# Patient Record
Sex: FEMALE | Race: WHITE | NOT HISPANIC OR LATINO | Employment: OTHER | URBAN - METROPOLITAN AREA
[De-identification: names, ages, dates, MRNs, and addresses within clinical notes are randomized per-mention and may not be internally consistent; named-entity substitution may affect disease eponyms.]

---

## 2017-07-18 DIAGNOSIS — Z12.31 SCREENING MAMMOGRAM, ENCOUNTER FOR: Primary | ICD-10-CM

## 2017-08-10 ENCOUNTER — HOSPITAL ENCOUNTER (OUTPATIENT)
Dept: RADIOLOGY | Facility: HOSPITAL | Age: 72
Discharge: HOME | End: 2017-08-10
Attending: RADIOLOGY
Payer: MEDICARE

## 2017-08-10 VITALS — BODY MASS INDEX: 40.97 KG/M2 | WEIGHT: 240 LBS | HEIGHT: 64 IN

## 2017-08-10 DIAGNOSIS — Z12.31 SCREENING MAMMOGRAM, ENCOUNTER FOR: ICD-10-CM

## 2017-08-10 PROCEDURE — 77067 SCR MAMMO BI INCL CAD: CPT | Mod: TC

## 2017-09-26 ENCOUNTER — TELEPHONE (OUTPATIENT)
Dept: FAMILY MEDICINE | Facility: CLINIC | Age: 72
End: 2017-09-26

## 2017-09-26 NOTE — TELEPHONE ENCOUNTER
----- Message from Fidelina Perrin sent at 9/26/2017  9:52 AM CDT -----  Contact: - Markso   request returned call from Dr Resendez to discuss wife condition.(personal)

## 2017-09-27 ENCOUNTER — OFFICE VISIT (OUTPATIENT)
Dept: FAMILY MEDICINE | Facility: CLINIC | Age: 72
End: 2017-09-27
Payer: MEDICARE

## 2017-09-27 DIAGNOSIS — G60.0 CMTD (CHARCOT-MARIE-TOOTH DISEASE): ICD-10-CM

## 2017-09-27 DIAGNOSIS — B37.2 CANDIDAL DERMATITIS: Primary | ICD-10-CM

## 2017-09-27 PROCEDURE — 99347 HOME/RES VST EST SF MDM 20: CPT | Mod: ,,, | Performed by: FAMILY MEDICINE

## 2017-09-27 PROCEDURE — 1159F MED LIST DOCD IN RCRD: CPT | Mod: S$GLB,,, | Performed by: FAMILY MEDICINE

## 2017-09-27 RX ORDER — FLUCONAZOLE 200 MG/1
200 TABLET ORAL DAILY
Qty: 10 TABLET | Refills: 0 | Status: SHIPPED | OUTPATIENT
Start: 2017-09-27 | End: 2017-11-15 | Stop reason: SDUPTHER

## 2017-10-01 PROBLEM — B37.2 CANDIDAL DERMATITIS: Status: ACTIVE | Noted: 2017-10-01

## 2017-10-01 PROBLEM — G60.0 CMTD (CHARCOT-MARIE-TOOTH DISEASE): Status: ACTIVE | Noted: 2017-10-01

## 2017-10-02 NOTE — PROGRESS NOTES
Subjective:      Patient ID: Yuly Leggett is a 71 y.o. female.    Chief Complaint: No chief complaint on file.    House call;  present; rash for several days left inguinal crease; OTC antifungal and steroid cream with out relief; has CMT and is chair bound with assistance with transfers by  or sitter; Dr Davis Goode is her internist for many years.      Review of Systems   Skin: Positive for rash.   Neurological: Positive for weakness.   All other systems reviewed and are negative.    Objective:     Physical Exam   Constitutional: She is oriented to person, place, and time. She appears well-developed and well-nourished.   HENT:   Head: Normocephalic.   Eyes: Conjunctivae and EOM are normal. Pupils are equal, round, and reactive to light.   Neck: Normal range of motion. Neck supple.   Cardiovascular: Normal rate, regular rhythm and normal heart sounds.    Pulmonary/Chest: Effort normal and breath sounds normal.   Musculoskeletal: Normal range of motion.   Neurological: She is alert and oriented to person, place, and time. She has normal reflexes.   Skin: Skin is warm and dry. Rash noted.   Red satellite lesions left inguinal area   Psychiatric: She has a normal mood and affect. Her behavior is normal. Judgment and thought content normal.     Assessment:     1. Candidal dermatitis    2. CMTD (Charcot-Selena-Tooth disease)      Plan:     New Prescriptions    FLUCONAZOLE (DIFLUCAN) 200 MG TAB    Take 1 tablet (200 mg total) by mouth once daily. daily for yeast     Discontinued Medications    No medications on file     Modified Medications    No medications on file       Candidal dermatitis    CMTD (Charcot-Selena-Tooth disease)      Rx for diflucan sent to pharmacy

## 2017-11-15 ENCOUNTER — TELEPHONE (OUTPATIENT)
Dept: FAMILY MEDICINE | Facility: CLINIC | Age: 72
End: 2017-11-15

## 2017-11-17 ENCOUNTER — TELEPHONE (OUTPATIENT)
Dept: FAMILY MEDICINE | Facility: CLINIC | Age: 72
End: 2017-11-17

## 2017-11-17 NOTE — TELEPHONE ENCOUNTER
----- Message from Fidelina Perrin sent at 11/17/2017  3:27 PM CST -----  Patient spoke with you on Wednesday. You were suppose to call in something for yeast infection. Patient says pharmacy hasn't received anything     [facility]

## 2017-11-18 RX ORDER — FLUCONAZOLE 200 MG/1
TABLET ORAL
Qty: 10 TABLET | Refills: 0 | Status: SHIPPED | OUTPATIENT
Start: 2017-11-18 | End: 2018-01-02 | Stop reason: SDUPTHER

## 2018-01-02 ENCOUNTER — TELEPHONE (OUTPATIENT)
Dept: FAMILY MEDICINE | Facility: CLINIC | Age: 73
End: 2018-01-02

## 2018-01-02 RX ORDER — FLUCONAZOLE 200 MG/1
TABLET ORAL
Qty: 30 TABLET | Refills: 5 | Status: SHIPPED | OUTPATIENT
Start: 2018-01-02 | End: 2018-07-21 | Stop reason: ALTCHOICE

## 2018-01-02 NOTE — TELEPHONE ENCOUNTER
----- Message from Fidelina Perrin sent at 1/2/2018  1:54 PM CST -----  Contact: Dakota Leggett-    thinks wife has developed another yeast infection. Has rash on both sides of vaginal area were top of leg connects to hip. Had yeast infection 2-3 month ago. Was given Rx but there are no refills.  would like something called in or if Dr Resendez feels needed, a home visit    [facility]

## 2018-01-19 ENCOUNTER — TELEPHONE (OUTPATIENT)
Dept: FAMILY MEDICINE | Facility: CLINIC | Age: 73
End: 2018-01-19

## 2018-01-19 DIAGNOSIS — Z79.899 OTHER LONG TERM (CURRENT) DRUG THERAPY: ICD-10-CM

## 2018-01-19 DIAGNOSIS — B37.2 CANDIDAL DERMATITIS: ICD-10-CM

## 2018-01-19 DIAGNOSIS — L29.9 PRURITUS: ICD-10-CM

## 2018-01-19 DIAGNOSIS — G60.0 CMTD (CHARCOT-MARIE-TOOTH DISEASE): Primary | ICD-10-CM

## 2018-01-20 ENCOUNTER — TELEPHONE (OUTPATIENT)
Dept: FAMILY MEDICINE | Facility: CLINIC | Age: 73
End: 2018-01-20

## 2018-01-20 RX ORDER — NYSTATIN 100000 [USP'U]/G
1 POWDER TOPICAL 4 TIMES DAILY
Qty: 226 G | Refills: 11 | Status: SHIPPED | OUTPATIENT
Start: 2018-01-20 | End: 2018-07-21 | Stop reason: ALTCHOICE

## 2018-01-20 NOTE — PROGRESS NOTES
Subjective:      Patient ID: Yuly Leggett is a 72 y.o. female.    Chief Complaint: No chief complaint on file.    reoccuring persisitan rash in groin, tabitha anal area; pt is essentially bedbound, pt of Dr Davis Goode, with Charcot Selena Toothe for 40 years; ;seen on house call at request of , who lives here in [place]; i've been rx diflucan with good, but incomlete clearance; pt is not a known diabetic; he also request physical therapy for relief of contractures that are developing;  cares for her; he recently a stent placed in coronary artery, and needs another.      Review of Systems   Constitutional: Positive for unexpected weight change.        Gained weight   HENT: Positive for trouble swallowing.    Skin: Positive for rash.   Neurological: Positive for weakness.   All other systems reviewed and are negative.    Objective:     Physical Exam   Constitutional: She is oriented to person, place, and time. She appears well-developed and well-nourished.   obese   HENT:   Head: Normocephalic.   Eyes: Conjunctivae and EOM are normal. Pupils are equal, round, and reactive to light.   Neck: Normal range of motion. Neck supple.   Cardiovascular: Normal rate, regular rhythm and normal heart sounds.    Pulmonary/Chest: Effort normal and breath sounds normal.   Musculoskeletal: She exhibits deformity. She exhibits no edema or tenderness.   Flexure contractures of ankles   Neurological: She is alert and oriented to person, place, and time. She displays abnormal reflex. She exhibits abnormal muscle tone.   Skin: Skin is warm and dry. Rash noted.   Yeast dermatitis of intertriginous folds   Psychiatric: She has a normal mood and affect. Her behavior is normal. Judgment and thought content normal.     Assessment:     No diagnosis found.  Plan:     New Prescriptions    NYSTATIN (MYCOSTATIN) POWDER    Apply 1 g topically 4 (four) times daily.     Discontinued Medications    No medications on file     Modified  Medications    No medications on file       There are no diagnoses linked to this encounter.    A:  Yeast dermatitis        CMT        Obese    P:  Rx for nystatin powder sent; HHN and PT constulted and labs ordered.

## 2018-01-25 ENCOUNTER — TELEPHONE (OUTPATIENT)
Dept: FAMILY MEDICINE | Facility: CLINIC | Age: 73
End: 2018-01-25

## 2018-01-25 NOTE — TELEPHONE ENCOUNTER
[facility] nurse unable to draw pt blood due to poor veins  The pt  wants to know if it is really necessary   To get these labs drawn    Please advise

## 2018-01-29 ENCOUNTER — TELEPHONE (OUTPATIENT)
Dept: FAMILY MEDICINE | Facility: CLINIC | Age: 73
End: 2018-01-29

## 2018-01-30 NOTE — TELEPHONE ENCOUNTER
----- Message from Fidelina Perrin sent at 1/29/2018  3:52 PM CST -----   - [facility] lab results 1/23/2018

## 2018-07-21 ENCOUNTER — HOSPITAL ENCOUNTER (EMERGENCY)
Facility: HOSPITAL | Age: 73
Discharge: HOME | End: 2018-07-21
Attending: EMERGENCY MEDICINE
Payer: MEDICARE

## 2018-07-21 VITALS
SYSTOLIC BLOOD PRESSURE: 115 MMHG | OXYGEN SATURATION: 95 % | RESPIRATION RATE: 18 BRPM | TEMPERATURE: 98 F | HEART RATE: 62 BPM | DIASTOLIC BLOOD PRESSURE: 97 MMHG

## 2018-07-21 DIAGNOSIS — K59.00 CONSTIPATION, UNSPECIFIED CONSTIPATION TYPE: ICD-10-CM

## 2018-07-21 DIAGNOSIS — R53.1 GENERALIZED WEAKNESS: ICD-10-CM

## 2018-07-21 DIAGNOSIS — N39.0 URINARY TRACT INFECTION WITHOUT HEMATURIA, SITE UNSPECIFIED: Primary | ICD-10-CM

## 2018-07-21 LAB
ALBUMIN SERPL BCP-MCNC: 3.9 G/DL
ALP SERPL-CCNC: 108 U/L
ALT SERPL W/O P-5'-P-CCNC: 13 U/L
ANION GAP SERPL CALC-SCNC: 9 MMOL/L
AST SERPL-CCNC: 16 U/L
BACTERIA #/AREA URNS HPF: ABNORMAL /HPF
BASOPHILS # BLD AUTO: 0.03 K/UL
BASOPHILS NFR BLD: 0.4 %
BILIRUB SERPL-MCNC: 0.2 MG/DL
BILIRUB UR QL STRIP: NEGATIVE
BNP SERPL-MCNC: 146 PG/ML
BUN SERPL-MCNC: 9 MG/DL
CALCIUM SERPL-MCNC: 9.4 MG/DL
CHLORIDE SERPL-SCNC: 107 MMOL/L
CLARITY UR: ABNORMAL
CO2 SERPL-SCNC: 24 MMOL/L
COLOR UR: YELLOW
CREAT SERPL-MCNC: 0.6 MG/DL
DIFFERENTIAL METHOD: ABNORMAL
EOSINOPHIL # BLD AUTO: 0.3 K/UL
EOSINOPHIL NFR BLD: 3.6 %
ERYTHROCYTE [DISTWIDTH] IN BLOOD BY AUTOMATED COUNT: 13.8 %
EST. GFR  (AFRICAN AMERICAN): >60 ML/MIN/1.73 M^2
EST. GFR  (NON AFRICAN AMERICAN): >60 ML/MIN/1.73 M^2
GLUCOSE SERPL-MCNC: 98 MG/DL
GLUCOSE UR QL STRIP: NEGATIVE
HCT VFR BLD AUTO: 46.4 %
HGB BLD-MCNC: 14.3 G/DL
HGB UR QL STRIP: NEGATIVE
KETONES UR QL STRIP: NEGATIVE
LEUKOCYTE ESTERASE UR QL STRIP: NEGATIVE
LYMPHOCYTES # BLD AUTO: 1.6 K/UL
LYMPHOCYTES NFR BLD: 22.6 %
MCH RBC QN AUTO: 28.4 PG
MCHC RBC AUTO-ENTMCNC: 30.8 G/DL
MCV RBC AUTO: 92 FL
MICROSCOPIC COMMENT: ABNORMAL
MONOCYTES # BLD AUTO: 0.4 K/UL
MONOCYTES NFR BLD: 5.6 %
NEUTROPHILS # BLD AUTO: 4.9 K/UL
NEUTROPHILS NFR BLD: 67.5 %
NITRITE UR QL STRIP: POSITIVE
PH UR STRIP: 7 [PH] (ref 5–8)
PLATELET # BLD AUTO: 268 K/UL
PMV BLD AUTO: 10.4 FL
POTASSIUM SERPL-SCNC: 4.3 MMOL/L
PROT SERPL-MCNC: 6.3 G/DL
PROT UR QL STRIP: NEGATIVE
RBC # BLD AUTO: 5.04 M/UL
SODIUM SERPL-SCNC: 140 MMOL/L
SP GR UR STRIP: 1.01 (ref 1–1.03)
TROPONIN I SERPL DL<=0.01 NG/ML-MCNC: <0.006 NG/ML
URN SPEC COLLECT METH UR: ABNORMAL
UROBILINOGEN UR STRIP-ACNC: NEGATIVE EU/DL
WBC # BLD AUTO: 7.27 K/UL
WBC #/AREA URNS HPF: 5 /HPF (ref 0–5)

## 2018-07-21 PROCEDURE — 99285 EMERGENCY DEPT VISIT HI MDM: CPT | Mod: 25

## 2018-07-21 PROCEDURE — 84484 ASSAY OF TROPONIN QUANT: CPT

## 2018-07-21 PROCEDURE — 80053 COMPREHEN METABOLIC PANEL: CPT

## 2018-07-21 PROCEDURE — P9612 CATHETERIZE FOR URINE SPEC: HCPCS

## 2018-07-21 PROCEDURE — 93010 ELECTROCARDIOGRAM REPORT: CPT | Mod: ,,, | Performed by: INTERNAL MEDICINE

## 2018-07-21 PROCEDURE — 85025 COMPLETE CBC W/AUTO DIFF WBC: CPT

## 2018-07-21 PROCEDURE — 81000 URINALYSIS NONAUTO W/SCOPE: CPT

## 2018-07-21 PROCEDURE — 93005 ELECTROCARDIOGRAM TRACING: CPT

## 2018-07-21 PROCEDURE — 83880 ASSAY OF NATRIURETIC PEPTIDE: CPT

## 2018-07-21 PROCEDURE — 25000003 PHARM REV CODE 250: Performed by: EMERGENCY MEDICINE

## 2018-07-21 RX ORDER — CITALOPRAM 40 MG/1
40 TABLET, FILM COATED ORAL NIGHTLY
COMMUNITY
End: 2019-08-04 | Stop reason: SDUPTHER

## 2018-07-21 RX ORDER — PROPRANOLOL HYDROCHLORIDE 60 MG/1
60 CAPSULE, EXTENDED RELEASE ORAL DAILY
COMMUNITY
End: 2019-06-18 | Stop reason: SDUPTHER

## 2018-07-21 RX ORDER — SULFAMETHOXAZOLE AND TRIMETHOPRIM 800; 160 MG/1; MG/1
1 TABLET ORAL
Status: COMPLETED | OUTPATIENT
Start: 2018-07-21 | End: 2018-07-21

## 2018-07-21 RX ORDER — IBUPROFEN 100 MG/5ML
1000 SUSPENSION, ORAL (FINAL DOSE FORM) ORAL DAILY
COMMUNITY

## 2018-07-21 RX ORDER — AMOXICILLIN 250 MG
1 CAPSULE ORAL 2 TIMES DAILY
Qty: 60 TABLET | Refills: 0 | Status: SHIPPED | OUTPATIENT
Start: 2018-07-21

## 2018-07-21 RX ORDER — LEVOTHYROXINE SODIUM 125 UG/1
125 TABLET ORAL DAILY
COMMUNITY

## 2018-07-21 RX ORDER — TOPIRAMATE 50 MG/1
50 TABLET, FILM COATED ORAL 2 TIMES DAILY
COMMUNITY
End: 2019-06-09 | Stop reason: SDUPTHER

## 2018-07-21 RX ORDER — SULFAMETHOXAZOLE AND TRIMETHOPRIM 800; 160 MG/1; MG/1
1 TABLET ORAL 2 TIMES DAILY
Qty: 14 TABLET | Refills: 0 | Status: SHIPPED | OUTPATIENT
Start: 2018-07-21 | End: 2018-07-28

## 2018-07-21 RX ORDER — TRAMADOL HYDROCHLORIDE 50 MG/1
100 TABLET ORAL EVERY 8 HOURS PRN
COMMUNITY
End: 2018-11-20 | Stop reason: SDUPTHER

## 2018-07-21 RX ORDER — AMITRIPTYLINE HYDROCHLORIDE 25 MG/1
25 TABLET, FILM COATED ORAL NIGHTLY PRN
COMMUNITY
End: 2018-12-03 | Stop reason: SDUPTHER

## 2018-07-21 RX ORDER — OXYBUTYNIN CHLORIDE 5 MG/1
5 TABLET ORAL 3 TIMES DAILY
COMMUNITY
End: 2019-02-26 | Stop reason: SDUPTHER

## 2018-07-21 RX ORDER — BUPROPION HYDROCHLORIDE 300 MG/1
300 TABLET ORAL DAILY
COMMUNITY

## 2018-07-21 RX ORDER — CHOLECALCIFEROL (VITAMIN D3) 50 MCG
TABLET ORAL DAILY
COMMUNITY

## 2018-07-21 RX ORDER — MONTELUKAST SODIUM 10 MG/1
10 TABLET ORAL NIGHTLY
COMMUNITY
End: 2019-04-02 | Stop reason: SDUPTHER

## 2018-07-21 RX ADMIN — SULFAMETHOXAZOLE AND TRIMETHOPRIM 1 TABLET: 800; 160 TABLET ORAL at 08:07

## 2018-07-21 NOTE — ED NOTES
APPEARANCE: Alert, oriented and in no acute distress.  CARDIAC: Normal rate and rhythm, no murmur heard.   PERIPHERAL VASCULAR: peripheral pulses present. Normal cap refill. No edema. Warm to touch.    RESPIRATORY:Normal rate and effort, breath sounds mild expiratory wheezing bilaterally throughout chest. Respirations are equal and unlabored no obvious signs of distress. SOB upon arrival   GASTRO: soft, bowel sounds normal, no tenderness, no abdominal distention.  MUSC: Full ROM. No bony tenderness or soft tissue tenderness. No obvious deformity. Generalized weakness x 6 days   SKIN: Skin is warm and dry, normal skin turgor, mucous membranes moist.  NEURO: 5/5 strength major flexors/extensors bilaterally. Sensory intact to light touch bilaterally. Kady coma scale: eyes open spontaneously-4, oriented & converses-5, obeys commands-6. No neurological abnormalities.   MENTAL STATUS: awake, alert and aware of environment.  EYE: PERRL, both eyes: pupils brisk and reactive to light. Normal size.  ENT: EARS: no obvious drainage. NOSE: no active bleeding.

## 2018-07-21 NOTE — ED PROVIDER NOTES
Encounter Date: 7/21/2018       History     Chief Complaint   Patient presents with    Weakness     Per EMS from home for increasing weakness and shortness of breath.       72F with Charcot Selena Tooth and declining condition (per ) was brought in by EMS for generalized weakness, shortness of breath, and constipation.  Symptoms began on Monday and have been gradually getting worse.  Her caretaker reports that she is weaker than usual, not able to assist with moving over in bed to transfer or get out of bed to use bedside commode.  Her  and caretaker also note that she seems short of breath with exertion and when she lays flat.  Symptoms improve with rest or sitting up.  She has chronic cough, unchanged.  She also reports not having a bowel movement x 5 days despite taking her usual laxatives and eating prunes and yogurt.  She has had a bowel obstruction before and they are concerned about that.  She recently took 5 days of medication for a skin yeast infection.  SOB is not present in the ER.          Review of patient's allergies indicates:  No Known Allergies  Past Medical History:   Diagnosis Date    Charcot-Selena-Tooth disease     Macular degeneration     Thyroid disease      History reviewed. No pertinent surgical history.  History reviewed. No pertinent family history.  Social History   Substance Use Topics    Smoking status: Never Smoker    Smokeless tobacco: Never Used    Alcohol use Not on file     Review of Systems   Constitutional: Negative for appetite change and fever.        Generalized weakness   Respiratory: Positive for cough and shortness of breath.    Gastrointestinal: Positive for abdominal pain and constipation. Negative for nausea and vomiting.   All other systems reviewed and are negative.      Physical Exam     Initial Vitals [07/21/18 1622]   BP Pulse Resp Temp SpO2   (!) 165/58 72 18 98.4 °F (36.9 °C) 98 %      MAP       --         Physical Exam    Nursing note and  vitals reviewed.  Constitutional: She appears well-developed and well-nourished. No distress.   HENT:   Head: Normocephalic and atraumatic.   Eyes: Conjunctivae are normal.   Neck: Normal range of motion.   Cardiovascular: Normal rate, regular rhythm and normal heart sounds.   Pulmonary/Chest: Breath sounds normal. No respiratory distress.   Abdominal: Soft. Bowel sounds are normal. She exhibits no distension. There is no tenderness. There is no rebound and no guarding.   Musculoskeletal: Normal range of motion.   Neurological: She is alert and oriented to person, place, and time.   Skin: Skin is warm and dry.   Psychiatric: She has a normal mood and affect. Thought content normal.         ED Course   Procedures  Labs Reviewed   CBC W/ AUTO DIFFERENTIAL - Abnormal; Notable for the following:        Result Value    MCHC 30.8 (*)     All other components within normal limits   B-TYPE NATRIURETIC PEPTIDE - Abnormal; Notable for the following:      (*)     All other components within normal limits   URINALYSIS, REFLEX TO URINE CULTURE - Abnormal; Notable for the following:     Appearance, UA Hazy (*)     Nitrite, UA Positive (*)     All other components within normal limits    Narrative:     Preferred Collection Type->Urine, Catheterized   URINALYSIS MICROSCOPIC - Abnormal; Notable for the following:     Bacteria, UA Many (*)     All other components within normal limits    Narrative:     Preferred Collection Type->Urine, Catheterized   COMPREHENSIVE METABOLIC PANEL   TROPONIN I     EKG Readings: (Independently Interpreted)   Initial Reading: No STEMI. Rhythm: Normal Sinus Rhythm. Heart Rate: 68. Ectopy: No Ectopy. Conduction: Normal. ST Segments: Normal ST Segments. T Waves: Normal. Clinical Impression: Normal Sinus Rhythm       Imaging Results          CT Abdomen Pelvis  Without Contrast (Final result)  Result time 07/21/18 19:43:20    Final result by Tatyana Davis MD (07/21/18 19:43:20)                  Impression:      1. No acute intra-abdominal abnormalities identified.  2. Cholecystectomy.  3. Calcified 5 cm left-sided uterine fibroid.  4. Additional findings as detailed above.      Electronically signed by: Tatyana Davis MD  Date:    07/21/2018  Time:    19:43             Narrative:    EXAMINATION:  CT ABDOMEN PELVIS WITHOUT CONTRAST    CLINICAL HISTORY:  abd pain with no BM x 5 days, hx bowel obstruction;    TECHNIQUE:  Low dose axial images, sagittal and coronal reformations were obtained from the lung bases to the pubic symphysis.  Oral contrast was not administered.    COMPARISON:  None    FINDINGS:  The visualized portion of the heart is unremarkable.  Mild atelectatic changes are seen at the lung bases.    No significant hepatic abnormality seen on this noncontrast exam.  There is no intra-or extrahepatic biliary ductal dilatation.  Gallbladder has been removed.  The stomach, pancreas, spleen, and adrenal glands are unremarkable.    Kidneys show no evidence of stones or hydronephrosis. Ureters are unremarkable along their courses.  Urinary bladder is nondistended.  Large 5 cm left-sided calcified uterine fibroid is seen.    The visualized loops of small and large bowel show no evidence of obstruction or inflammation.  No free air or free fluid.    Aorta tapers normally with mild atherosclerosis.    No acute osseous abnormality identified. Subcutaneous soft tissue structures are unremarkable.                               X-Ray Chest 1 View (Final result)  Result time 07/21/18 18:03:12    Final result by Tatyana Davis MD (07/21/18 18:03:12)                 Impression:      Poor inspiratory effort.  No acute cardiopulmonary process identified.      Electronically signed by: Tatyana Davis MD  Date:    07/21/2018  Time:    18:03             Narrative:    EXAMINATION:  XR CHEST 1 VIEW    CLINICAL HISTORY:  SOB;    TECHNIQUE:  Single frontal view of the chest was  performed.    COMPARISON:  None    FINDINGS:  Cardiac silhouette is normal in size.  Lungs are hypoinflated which accentuates pulmonary vascular markings.  Minimal scarring or atelectasis seen at the left lung base.  Lungs are otherwise clear with no focal consolidation, pneumothorax, or significant effusion seen.  No acute osseous abnormality identified.                                 Medical Decision Making:   History:   I obtained history from: someone other than patient.       <> Summary of History:  and caretaker  Independently Interpreted Test(s):   I have ordered and independently interpreted EKG Reading(s) - see prior notes  Clinical Tests:   Lab Tests: Ordered and Reviewed  Radiological Study: Ordered and Reviewed  Medical Tests: Ordered and Reviewed  ED Management:  72F with gen weakness, constipation and SOB with exertion or laying flat.  No signs of edema on exam.  No distress on exam.  Labs are unremarkable except for UA showing UTI.  No acute findings on CT abd/pelvis.  No signs of MI, CHF or pneumonia.  I will treat UTI with bactrim; culture pending.  Senokot-s for constipation.                      Clinical Impression:   The primary encounter diagnosis was Urinary tract infection without hematuria, site unspecified. Diagnoses of Generalized weakness and Constipation, unspecified constipation type were also pertinent to this visit.                             Kayla Spencer MD  07/21/18 [id number]

## 2018-07-22 NOTE — DISCHARGE INSTRUCTIONS
Take ALL of the prescribed antibiotics.  Drink plenty of water.  Take senokot-s in addition to your regular medications.  You may try enemas or suppositories if you do not get relief with this treatment.  Return to ER for any concerns or worsening symptoms.

## 2018-07-25 ENCOUNTER — PATIENT MESSAGE (OUTPATIENT)
Dept: FAMILY MEDICINE | Facility: CLINIC | Age: 73
End: 2018-07-25

## 2018-07-25 ENCOUNTER — NURSE TRIAGE (OUTPATIENT)
Dept: ADMINISTRATIVE | Facility: CLINIC | Age: 73
End: 2018-07-25

## 2018-07-25 ENCOUNTER — TELEPHONE (OUTPATIENT)
Dept: FAMILY MEDICINE | Facility: CLINIC | Age: 73
End: 2018-07-25

## 2018-07-25 NOTE — TELEPHONE ENCOUNTER
----- Message from Alejandra Yamilka sent at 7/25/2018  1:19 PM CDT -----  Contact: Phill, spouse, [phone]   States patient was seen at [facility] emergency department on 7/21 for a bladder infection. Patient was prescribed medication but they don't know if it's the correct medication until culture was finalized today. Please advise.

## 2018-07-25 NOTE — TELEPHONE ENCOUNTER
I spoke to the pt's .  He will continue to monitor her.  He will call back if needed.  Urine culture was never ran.

## 2018-07-25 NOTE — TELEPHONE ENCOUNTER
Sowmya Small Staff   Caller: [phone] / (Today,  8:47 AM)             Patient's  is requesting a call back regarding an ER visit this weekend. She was tested for UTI and wants results. Please advise.

## 2018-07-25 NOTE — TELEPHONE ENCOUNTER
I spoke to the lab.  The urine was never cultured.    I notified the pt's .    He states that they will continue the medication that was rx'd.  He does feel like she is a lil better.     He said to please call him if they should be doing something different.

## 2018-08-14 ENCOUNTER — TELEPHONE (OUTPATIENT)
Dept: FAMILY MEDICINE | Facility: CLINIC | Age: 73
End: 2018-08-14

## 2018-08-14 NOTE — TELEPHONE ENCOUNTER
----- Message from Robe Lay sent at 8/14/2018  8:50 AM CDT -----  Contact: Dr Goode/[phone]  He is aware that Dr Resendez will be calling this afternoon.

## 2018-08-15 NOTE — TELEPHONE ENCOUNTER
I spoke to Dr Goode; pt/ should call when I need to make a house call to see her.  He'll call here or my cell number

## 2018-11-01 ENCOUNTER — HOSPITAL ENCOUNTER (EMERGENCY)
Facility: HOSPITAL | Age: 73
Discharge: HOME | End: 2018-11-01
Attending: EMERGENCY MEDICINE
Payer: MEDICARE

## 2018-11-01 VITALS
OXYGEN SATURATION: 96 % | RESPIRATION RATE: 18 BRPM | HEART RATE: 70 BPM | TEMPERATURE: 98 F | DIASTOLIC BLOOD PRESSURE: 80 MMHG | SYSTOLIC BLOOD PRESSURE: 133 MMHG

## 2018-11-01 DIAGNOSIS — M79.671 RIGHT FOOT PAIN: ICD-10-CM

## 2018-11-01 DIAGNOSIS — M25.561 RIGHT KNEE PAIN: ICD-10-CM
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
## 2018-11-01 PROCEDURE — 99284 EMERGENCY DEPT VISIT MOD MDM: CPT | Mod: 25

## 2018-11-01 PROCEDURE — 25000003 PHARM REV CODE 250: Performed by: EMERGENCY MEDICINE

## 2018-11-01 RX ORDER — TRAMADOL HYDROCHLORIDE 50 MG/1
50 TABLET ORAL
Status: COMPLETED | OUTPATIENT
Start: 2018-11-01 | End: 2018-11-01

## 2018-11-01 RX ADMIN — TRAMADOL HYDROCHLORIDE 50 MG: 50 TABLET, COATED ORAL at 01:11

## 2018-11-01 NOTE — ED NOTES
Patient provided with ice chips per MD order at this time; and  provided with cup of water, call bell within reach pillow provided and blankets patient states she has no further needs at this time will continue to monitor.

## 2018-11-01 NOTE — ED PROVIDER NOTES
Encounter Date: 11/1/2018    SCRIBE #1 NOTE: I, Jeremias Gillis, am scribing for, and in the presence of,  Dr. Rooney. I have scribed the entire note.       History     Chief Complaint   Patient presents with    Fall     fell while trying to scoot from WC to bed. 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 Pt report right hip pain.      Yuly Leggett is a 73 y.o. female who  has a past medical history of Charcot-Selena-Tooth disease, Macular degeneration, and Thyroid disease.    The patient presents to the ED due to right leg pain s/p fall tonight. The patient fell while moving in bed, land on the ground on her right leg. Patient denies any head trauma, neck/back pain, LOC, numbness/tingling, or any other concerning symptoms. Patient is wheelchair bound at baseline, and has a history of CMT disease. Family reports patient takes Tramadol daily for pain.      The history is provided by the patient.     Review of patient's allergies indicates:  No Known Allergies  Past Medical History:   Diagnosis Date    Charcot-Selena-Tooth disease     Macular degeneration     Thyroid disease      No past surgical history on file.  No family history on file.  Social History     Tobacco Use    Smoking status: Never Smoker    Smokeless tobacco: Never Used   Substance Use Topics    Alcohol use: Not on file    Drug use: Not on file     Review of Systems   Constitutional: Negative for chills and fever.   HENT: Negative for sore throat.    Respiratory: Negative for cough and shortness of breath.    Cardiovascular: Negative for chest pain.   Gastrointestinal: Negative for nausea and vomiting.   Genitourinary: Negative for dysuria, frequency and urgency.   Musculoskeletal: Positive for arthralgias and myalgias. Negative for back pain, joint swelling, neck pain and neck stiffness.   Skin: Negative for rash and wound.   Neurological: Negative for syncope and weakness.   Hematological: Does not bruise/bleed easily.   Psychiatric/Behavioral:  Negative for agitation, behavioral problems and confusion.     Physical Exam     Initial Vitals [11/01/18 0209]   BP Pulse Resp Temp SpO2   116/76 68 18 97.8 °F (36.6 °C) (!) 89 %      MAP       --         Physical Exam    Nursing note and vitals reviewed.  Constitutional: She appears well-developed and well-nourished. She is not diaphoretic. No distress.   Pleasant, NAD.   HENT:   Head: Normocephalic and atraumatic.   Mouth/Throat: Oropharynx is clear and moist.   Eyes: EOM are normal. Pupils are equal, round, and reactive to light.   Neck: No tracheal deviation present.   Cardiovascular: Normal rate, regular rhythm, normal heart sounds and intact distal pulses.   Pulmonary/Chest: Breath sounds normal. No stridor. No respiratory distress. She has no wheezes.   Abdominal: Soft. Bowel sounds are normal. She exhibits no distension and no mass. There is no tenderness.   Morbidly obese   Musculoskeletal: Normal range of motion. She exhibits no edema.   Mild anterior hip and thigh TTP  Charcot feet deformity bilaterally   Neurological: She is alert and oriented to person, place, and time. She has normal strength. No cranial nerve deficit or sensory deficit.   Skin: Skin is warm and dry. Capillary refill takes less than 2 seconds. No pallor.   Tiny focal anterior thigh bruising   Psychiatric: She has a normal mood and affect. Her behavior is normal. Thought content normal.       ED Course   Procedures  Labs Reviewed - No data to display       X-Rays:   Independently Interpreted Readings:   Other Readings:  Reviewed by myself, read by radiology.    Imaging Results          X-Ray Foot Complete Right (Final result)  Result time 11/01/18 05:42:34    Final result by Kelly Vance MD (11/01/18 05:42:34)                 Impression:      1. No evidence of acute displaced fracture.  2. Findings suggestive of disuse osteopenia.  3. Postoperative change of the medial and lateral malleoli.      Electronically signed by: Kelly  MD Rajiv  Date:    11/01/2018  Time:    05:42             Narrative:    EXAMINATION:  XR FOOT COMPLETE 3 VIEW RIGHT    CLINICAL HISTORY:  . Pain in right foot    TECHNIQUE:  AP, lateral, and oblique views of the right foot were performed.    COMPARISON:  Tibia fibula radiographs 11/01/2018    FINDINGS:  There is diffuse osteopenia, possibly relating to disuse, which does limit evaluation for acute fracture.  Allowing for this, visualized osseous structures appear intact without evidence of acute displaced fracture.  There is postoperative change of the lateral and medial malleoli.  There is degenerative midfoot change.  No retained radiopaque foreign body.                               CT Pelvis Without Contrast (Final result)  Result time 11/01/18 04:41:52    Final result by Jonathan Pennington MD (11/01/18 04:41:52)                 Impression:      No acute fracture identified in the pelvis.      Electronically signed by: Jonathan Pennington MD  Date:    11/01/2018  Time:    04:41             Narrative:    EXAMINATION:  CT PELVIS WITHOUT CONTRAST    CLINICAL HISTORY:  R hip pain after fall, eval for non-displaced fracture;    TECHNIQUE:  Helical CT images of the pelvis were obtained without IV contrast.  Axial, coronal, and sagittal reconstructions were created.    COMPARISON:  Pelvic radiograph, 11/01/2018.  CT abdomen and pelvis without contrast, 07/21/2018.    FINDINGS:  Pelvic bowel is unremarkable.  There is mild calcific atherosclerosis involving the distal abdominal aorta.  Calcified uterine fibroids again noted.  Urinary bladder and rectum are within expected limits.    Bones are relatively demineralized.  No acute fracture is identified.  No advanced degenerative changes.  Moderate degree of muscular atrophy in the imaged lower extremities.  No change when compared with the prior exam.                               X-Ray Knee 3 View Right (Final result)  Result time 11/01/18 03:05:49    Final result by  Jonathan Pennington MD (11/01/18 03:05:49)                 Impression:      No displaced fracture, noting limitations from diffuse osteopenia.      Electronically signed by: Jonathan Pennington MD  Date:    11/01/2018  Time:    03:05             Narrative:    EXAMINATION:  XR KNEE 3 VIEW RIGHT    CLINICAL HISTORY:  Pain in right knee.    TECHNIQUE:  Three views of the right knee were performed.    COMPARISON:  None.    FINDINGS:  Bones are demineralized.  No displaced fracture is identified.  Alignment is normal.  There are mild degenerative changes.  Soft tissues are symmetric.  No radiopaque foreign body.                               X-Ray Femur Ap/Lat Right (Final result)  Result time 11/01/18 03:03:47    Final result by Aravind Campuzano MD (11/01/18 03:03:47)                 Impression:      Minimal cortical step-off involving the base of the subcapital aspect of the right femoral neck.  The findings may represent a nondisplaced fracture.  MRI of the right hip may be attempted for further evaluation.      Electronically signed by: Aravind Campuzano MD  Date:    11/01/2018  Time:    03:03             Narrative:    EXAMINATION:  XR FEMUR 2 VIEW RIGHT    CLINICAL HISTORY:  Fall from bed, initial encounter    TECHNIQUE:  AP and lateral views of the right femur were performed.    COMPARISON:  CT scan of the abdomen and pelvis dated 07/21/2018.    FINDINGS:  There is minimal cortical step-off involving the subcapital aspect of the right femoral neck.  No additional cortical step-off is identified.  There is diffuse demineralization of the right femur.  There is no evidence of a dislocation.  The soft tissues are unremarkable.                               X-Ray Tibia Fibula 2 View Right (Final result)  Result time 11/01/18 02:59:53    Final result by Jonathan Pennington MD (11/01/18 02:59:53)                 Impression:      No displaced fracture.      Electronically signed by: Jonathan Pennington  MD  Date:    11/01/2018  Time:    02:59             Narrative:    EXAMINATION:  XR TIBIA FIBULA 2 VIEW RIGHT    CLINICAL HISTORY:  Fall from bed, initial encounter    TECHNIQUE:  AP and lateral views of the right tibia and fibula were performed.    COMPARISON:  None.    FINDINGS:  Bones are demineralized.  Postoperative changes involving the lateral and medial malleoli.  No displaced fracture identified.  Alignment is grossly normal.                               X-Ray Pelvis Routine AP (Final result)  Result time 11/01/18 03:00:21    Final result by Aravind Campuzano MD (11/01/18 03:00:21)                 Impression:      No evidence of acute fracture or dislocation of the pelvic bones, allowing for diffuse osteopenia.  Follow-up with MRI of the pelvis may be obtained for further evaluation.      Electronically signed by: Aravind Campuzano MD  Date:    11/01/2018  Time:    03:00             Narrative:    EXAMINATION:  XR PELVIS ROUTINE AP    CLINICAL HISTORY:  fall from bed;    TECHNIQUE:  AP view of the pelvis was performed.    COMPARISON:  CT scan of the abdomen and pelvis dated 07/21/2018.    FINDINGS:  Patient positioning is somewhat suboptimal.    There is diffuse demineralization of the osseous structures.  The pelvic ring appears intact.  There appears to be chronic bilateral deformities involving the hip joints.  There is joint space narrowing involving the sacroiliac joints and pubic symphysis.  No evidence of a displaced fracture is identified.    There are calcified fibroids within the left hemipelvis.  There are phleboliths present.  The bowel gas pattern is unremarkable.                              Medical Decision Making:   Initial Assessment:   This is a 73 y.o. female with PMHX of Charcot-Selena-Tooth, wheelchair-bound at baseline, who presents with right leg pain after fall from bed. Will obtain x-rays of hip, thigh, leg, knee, ankle, and reassess.  Differential Diagnosis:   Differential Diagnosis includes,  but is not limited to:  Fracture, dislocation, compartment syndrome, nerve injury/palsy, vascular injury, rhabdomyolysis, hemarthrosis, septic joint, bursitis, muscle strain, ligament tear/sprain, laceration with foreign body, abrasion, soft tissue contusion, osteoarthritis.    Clinical Tests:   Radiological Study: Ordered and Reviewed  ED Management:  Imaging negative for acute findings.   Patient counseled on symptomatic and supportive care.  Pt stable for discharge.  Upon re-evaluation, the patient's status has remained stable.  After complete ED evaluation, clinical impression is most consistent with leg pain after fall.  At this time, I feel there is no emergent condition requiring further evaluation or admission. I believe the patient is stable for discharge from the ED. The patient and any additional family present were updated with test results, overall clinical impression, and recommended further plan of care. All questions were answered. The patient expressed understanding and agreed with current plan for discharge with PCP follow-up within 1 week. Strict return precautions were provided, including return/worsening of current symptoms or any other concerns.                         Clinical Impression:     1. Fall from bed    2. Right knee pain    3. Fall from bed    4. Right foot pain        Disposition:   Disposition: Discharged  Condition: Stable      I, Dr. Matt Rooney, personally performed the services described in this documentation. All medical record entries made by the scribe were at my direction and in my presence.  I have reviewed the chart and agree that the record reflects my personal performance and is accurate and complete.     Matt Rooney MD.     Matt Rooney MD  11/08/18 6924

## 2018-11-20 NOTE — TELEPHONE ENCOUNTER
----- Message from Blanche Torres sent at 11/20/2018  8:50 AM CST -----  Contact: [phone]  Patient would like refill of traMADol (ULTRAM) 50 mg tablet - Take 100 mg by mouth every 8 (eight) hours as needed for Pain sent to [facility] #[id number]. Please call.

## 2018-11-21 ENCOUNTER — PATIENT MESSAGE (OUTPATIENT)
Dept: FAMILY MEDICINE | Facility: CLINIC | Age: 73
End: 2018-11-21

## 2018-11-21 RX ORDER — TRAMADOL HYDROCHLORIDE 50 MG/1
100 TABLET ORAL 4 TIMES DAILY
Qty: 240 TABLET | Refills: 5 | Status: SHIPPED | OUTPATIENT
Start: 2018-11-21 | End: 2019-05-22 | Stop reason: SDUPTHER

## 2018-11-21 RX ORDER — TRAMADOL HYDROCHLORIDE 50 MG/1
100 TABLET ORAL EVERY 8 HOURS PRN
Qty: 180 TABLET | Refills: 3 | Status: SHIPPED | OUTPATIENT
Start: 2018-11-21 | End: 2018-11-21 | Stop reason: SDUPTHER

## 2018-12-03 NOTE — TELEPHONE ENCOUNTER
"----- Message from Fidelina Perrin sent at 12/3/2018  2:19 PM CST -----  Contact:  "Dakota" stopped by  Patient is requesting a medication refill.     RX name: amitriptyline (ELAVIL) 25 MG tablet  Strength:   Quantity: 90 day with refills   Directions: Take 25 mg by mouth nightly as needed for Insomnia. - Oral    [facility]      "

## 2018-12-04 RX ORDER — AMITRIPTYLINE HYDROCHLORIDE 25 MG/1
25 TABLET, FILM COATED ORAL NIGHTLY PRN
Qty: 90 TABLET | Refills: 0 | Status: SHIPPED | OUTPATIENT
Start: 2018-12-04 | End: 2018-12-29 | Stop reason: SDUPTHER

## 2018-12-30 RX ORDER — AMITRIPTYLINE HYDROCHLORIDE 25 MG/1
25 TABLET, FILM COATED ORAL NIGHTLY PRN
Qty: 90 TABLET | Refills: 3 | Status: SHIPPED | OUTPATIENT
Start: 2018-12-30 | End: 2020-03-11

## 2019-02-27 RX ORDER — OXYBUTYNIN CHLORIDE 5 MG/1
TABLET ORAL
Qty: 90 TABLET | Refills: 2 | Status: SHIPPED | OUTPATIENT
Start: 2019-02-27 | End: 2019-12-07 | Stop reason: SDUPTHER

## 2019-03-14 RX ORDER — FLUCONAZOLE 200 MG/1
TABLET ORAL
Qty: 30 TABLET | Refills: 0 | Status: SHIPPED | OUTPATIENT
Start: 2019-03-14

## 2019-04-03 RX ORDER — MONTELUKAST SODIUM 10 MG/1
TABLET ORAL
Qty: 90 TABLET | Refills: 2 | Status: SHIPPED | OUTPATIENT
Start: 2019-04-03 | End: 2020-01-06

## 2019-05-13 ENCOUNTER — OFFICE VISIT (OUTPATIENT)
Dept: FAMILY MEDICINE | Facility: CLINIC | Age: 74
End: 2019-05-13
Payer: MEDICARE

## 2019-05-13 DIAGNOSIS — G60.0 CMTD (CHARCOT-MARIE-TOOTH DISEASE): Primary | ICD-10-CM

## 2019-05-13 DIAGNOSIS — H35.30 MACULAR DEGENERATION, UNSPECIFIED LATERALITY, UNSPECIFIED TYPE: ICD-10-CM

## 2019-05-13 DIAGNOSIS — R25.1 TREMOR: ICD-10-CM

## 2019-05-13 DIAGNOSIS — B37.2 CANDIDAL DERMATITIS: ICD-10-CM

## 2019-05-13 DIAGNOSIS — E03.4 ACQUIRED ATROPHY OF THYROID: ICD-10-CM

## 2019-05-13 DIAGNOSIS — M94.9 DISORDER OF CARTILAGE: ICD-10-CM

## 2019-05-13 DIAGNOSIS — T56.1X2A: ICD-10-CM

## 2019-05-13 PROCEDURE — 99349 HOME/RES VST EST MOD MDM 40: CPT | Mod: S$GLB,,, | Performed by: FAMILY MEDICINE

## 2019-05-13 PROCEDURE — 99349 PR HOME VISIT,ESTAB PATIENT,LEVEL III: ICD-10-PCS | Mod: S$GLB,,, | Performed by: FAMILY MEDICINE

## 2019-05-14 ENCOUNTER — TELEPHONE (OUTPATIENT)
Dept: FAMILY MEDICINE | Facility: CLINIC | Age: 74
End: 2019-05-14

## 2019-05-14 NOTE — PROGRESS NOTES
Subjective:      Patient ID: Yuly Leggett is a 73 y.o. female.    Chief Complaint: No chief complaint on file.      HPI   Pt seen at request of  for tremor; she has Charcot Selena Tooth disease and is very weak, spends most time in bed, has an aide to come help  andrequires a wheelchair for ambulation  She also has very poor vision due to macular degen  Her tremor is intentional and is already on a blocker    Review of Systems   Constitutional: Negative.    HENT: Negative.    Respiratory: Negative.    Cardiovascular: Negative.    Gastrointestinal: Negative.    Endocrine: Negative.    Genitourinary: Negative.    Musculoskeletal: Negative.    Neurological: Positive for tremors and weakness.   Psychiatric/Behavioral: Negative.    All other systems reviewed and are negative.    Objective:     Physical Exam   Constitutional: She is oriented to person, place, and time.   HENT:   Head: Normocephalic.   Eyes: Pupils are equal, round, and reactive to light. Conjunctivae and EOM are normal.   Neck: Normal range of motion. Neck supple.   Cardiovascular: Normal rate, regular rhythm and normal heart sounds.   Pulmonary/Chest: Effort normal and breath sounds normal.   Musculoskeletal: She exhibits deformity.   Atrophy and contractures   Neurological: She is alert and oriented to person, place, and time. She displays abnormal reflex. A sensory deficit is present. She exhibits abnormal muscle tone. Coordination abnormal.   Skin: Skin is warm and dry.   Psychiatric: She has a normal mood and affect. Her behavior is normal. Judgment and thought content normal.   Nursing note and vitals reviewed.    Assessment:     1. CMTD (Charcot-Selena-Tooth disease)    2. Candidal dermatitis    3. Tremor    4. Acquired atrophy of thyroid     5. Disorder of cartilage     6. Macular degeneration, unspecified laterality, unspecified type    7. Mercurial tremor, intentional self-harm, initial encounter      Plan:        Medication List            Accurate as of 5/13/19 11:59 PM. If you have any questions, ask your nurse or doctor.               CONTINUE taking these medications    amitriptyline 25 MG tablet  Commonly known as:  ELAVIL  TAKE 1 TABLET (25 MG TOTAL) BY MOUTH NIGHTLY AS NEEDED FOR INSOMNIA.     ascorbic acid (vitamin C) 1000 MG tablet  Commonly known as:  VITAMIN C     buPROPion 300 MG 24 hr tablet  Commonly known as:  WELLBUTRIN XL     citalopram 40 MG tablet  Commonly known as:  CELEXA     CORICIDIN HBP COLD-MULTI SYMPT 6.25- mg/15 mL Liqd  Generic drug:  doxylamine-DM-acetaminophen     fluconazole 200 MG Tab  Commonly known as:  DIFLUCAN  TAKE 1 TABLET (200 MG TOTAL) BY MOUTH ONCE DAILY FOR YEAST     levothyroxine 125 MCG tablet  Commonly known as:  SYNTHROID     montelukast 10 mg tablet  Commonly known as:  SINGULAIR  TAKE 1 TABLET BY MOUTH EVERY DAY AFTER DINNER     oxybutynin 5 MG Tab  Commonly known as:  DITROPAN  TAKE 1 TABLET BY MOUTH DAILY     PRESERVISION AREDS-2 701-491-81-1 mg-unit-mg-mg Cap  Generic drug:  vit C,E-Zk-bultk-lutein-zeaxan     propranolol 60 MG 24 hr capsule  Commonly known as:  INDERAL [place]     senna-docusate 8.6-50 mg 8.6-50 mg per tablet  Commonly known as:  PERICOLACE  Take 1 tablet by mouth 2 (two) times daily.     topiramate 50 MG tablet  Commonly known as:  TOPAMAX     traMADol 50 mg tablet  Commonly known as:  ULTRAM  Take 2 tablets (100 mg total) by mouth 4 (four) times daily.     VITAMIN D3 2,000 unit Tab  Generic drug:  cholecalciferol (vitamin D3)          CMTD (Charcot-Selena-Tooth disease)  -     CBC auto differential; Future; Expected date: 05/14/2019  -     Comprehensive metabolic panel; Future; Expected date: 05/14/2019  -     Lipid panel; Future  -     TSH; Future  -     Vitamin D; Future  -     Sedimentation rate; Future  -     Ambulatory referral to Home Health    Candidal dermatitis  -     CBC auto differential; Future; Expected date: 05/14/2019  -     Comprehensive metabolic panel;  Future; Expected date: 05/14/2019  -     Lipid panel; Future  -     TSH; Future  -     Vitamin D; Future  -     Sedimentation rate; Future  -     Ambulatory referral to Home Health    Tremor  -     CBC auto differential; Future; Expected date: 05/14/2019  -     Comprehensive metabolic panel; Future; Expected date: 05/14/2019  -     Lipid panel; Future  -     TSH; Future  -     Vitamin D; Future  -     Sedimentation rate; Future  -     Ambulatory referral to Home Health    Acquired atrophy of thyroid   -     Lipid panel; Future    Disorder of cartilage   -     Vitamin D; Future    Macular degeneration, unspecified laterality, unspecified type    Mercurial tremor, intentional self-harm, initial encounter    double inderal [place] to 2 daily for tremor

## 2019-05-15 NOTE — TELEPHONE ENCOUNTER
----- Message from Alejandra Prather sent at 5/15/2019 11:30 AM CDT -----  Contact: Felicitas, [facility], [phone]  Called in requesting notes from most recent office visit faxed to [phone].

## 2019-05-16 PROCEDURE — G0180 PR HOME HEALTH MD CERTIFICATION: ICD-10-PCS | Mod: ,,, | Performed by: FAMILY MEDICINE

## 2019-05-16 PROCEDURE — G0180 MD CERTIFICATION HHA PATIENT: HCPCS | Mod: ,,, | Performed by: FAMILY MEDICINE

## 2019-05-19 ENCOUNTER — TELEPHONE (OUTPATIENT)
Dept: FAMILY MEDICINE | Facility: CLINIC | Age: 74
End: 2019-05-19

## 2019-05-20 ENCOUNTER — TELEPHONE (OUTPATIENT)
Dept: FAMILY MEDICINE | Facility: CLINIC | Age: 74
End: 2019-05-20

## 2019-05-20 NOTE — TELEPHONE ENCOUNTER
----- Message from Danielle Baron sent at 5/20/2019  3:56 PM CDT -----  Contact: aleyda [phone]  Patient would like a call with test results.

## 2019-05-21 NOTE — TELEPHONE ENCOUNTER
I spoke to pt; all good but low Vit D, will take oTC D 5,000 units daily; shaking is gonen with doubling inderal

## 2019-05-22 NOTE — TELEPHONE ENCOUNTER
----- Message from Sowmya Zavala sent at 5/22/2019  3:46 PM CDT -----  Contact: [phone]/   Type:  RX Refill Request    Who Called:   Refill or New Rx:New  RX Name and Strength:traMADol (ULTRAM) 50 mg tablet  How is the patient currently taking it? (ex. 1XDay):Take 2 tablets (100 mg total) by mouth 4 (four) times daily. - Oral  Is this a 30 day or 90 day RX:30  Preferred Pharmacy with phone number:[facility]ACY #[id number] - [place], [place] - [address] AT [facility]  Local or Mail Order:local  Ordering Provider  Would the patient rather a call back or a response via [facility]? cll  Best Call Back Number:  Additional Information:

## 2019-05-23 RX ORDER — TRAMADOL HYDROCHLORIDE 50 MG/1
100 TABLET ORAL 4 TIMES DAILY
Qty: 240 TABLET | Refills: 5 | OUTPATIENT
Start: 2019-05-23

## 2019-05-23 RX ORDER — TRAMADOL HYDROCHLORIDE 50 MG/1
100 TABLET ORAL 4 TIMES DAILY
Qty: 240 TABLET | Refills: 1 | Status: SHIPPED | OUTPATIENT
Start: 2019-05-23 | End: 2019-08-03 | Stop reason: SDUPTHER

## 2019-05-28 ENCOUNTER — TELEPHONE (OUTPATIENT)
Dept: FAMILY MEDICINE | Facility: CLINIC | Age: 74
End: 2019-05-28

## 2019-05-28 NOTE — TELEPHONE ENCOUNTER
----- Message from Elsie Juarez MA sent at 5/28/2019  4:01 PM CDT -----  Contact: Felicitas/[facility]/[phone] opt 1      ----- Message -----  From: Sowmya Zavala  Sent: 5/28/2019   3:27 PM  To: Dipti Small Staff    [facility] is requesting last clinical notes from 05/13.   Fax to   [phone]

## 2019-05-28 NOTE — TELEPHONE ENCOUNTER
Felicitas with [facility] is requesting that you transcribe a progress note from your most recent house call (that will be suitable for a face to face encounter).

## 2019-05-31 ENCOUNTER — EXTERNAL CHRONIC CARE MANAGEMENT (OUTPATIENT)
Dept: PRIMARY CARE CLINIC | Facility: CLINIC | Age: 74
End: 2019-05-31
Payer: MEDICARE

## 2019-05-31 PROCEDURE — 99490 CHRNC CARE MGMT STAFF 1ST 20: CPT | Mod: PBBFAC | Performed by: FAMILY MEDICINE

## 2019-05-31 PROCEDURE — 99490 PR CHRONIC CARE MGMT, 1ST 20 MIN: ICD-10-PCS | Mod: S$PBB,,, | Performed by: FAMILY MEDICINE

## 2019-05-31 PROCEDURE — 99490 CHRNC CARE MGMT STAFF 1ST 20: CPT | Mod: S$PBB,,, | Performed by: FAMILY MEDICINE

## 2019-06-06 ENCOUNTER — TELEPHONE (OUTPATIENT)
Dept: FAMILY MEDICINE | Facility: CLINIC | Age: 74
End: 2019-06-06

## 2019-06-06 NOTE — TELEPHONE ENCOUNTER
----- Message from Sowmya Zavala sent at 6/6/2019  2:48 PM CDT -----  Contact: [facility]/[place]/[phone] opt 1  Home Health nurse is requesting the last office notes be sent to the office. Can't submit the oasis without these notes. 3rd request  Fax  [phone]

## 2019-06-07 ENCOUNTER — TELEPHONE (OUTPATIENT)
Dept: INTERNAL MEDICINE | Facility: CLINIC | Age: 74
End: 2019-06-07

## 2019-06-07 NOTE — TELEPHONE ENCOUNTER
Dr Resendez - Please go in and chart the house call for DOS 5/13/2019.  [facility] needs a copy to submit in order to get paid for their services.

## 2019-06-10 RX ORDER — TOPIRAMATE 50 MG/1
TABLET, FILM COATED ORAL
Qty: 540 TABLET | Refills: 3 | Status: SHIPPED | OUTPATIENT
Start: 2019-06-10

## 2019-06-12 ENCOUNTER — EXTERNAL HOME HEALTH (OUTPATIENT)
Dept: HOME HEALTH SERVICES | Facility: HOSPITAL | Age: 74
End: 2019-06-12
Payer: MEDICARE

## 2019-06-12 PROBLEM — H35.30 MACULAR DEGENERATION: Status: ACTIVE | Noted: 2019-06-12

## 2019-06-18 NOTE — TELEPHONE ENCOUNTER
[facility] faxed to inform you that they will be discharging pt from their services, but pt is in need of Inderal 120 mg rx (as this is an increase from her previous dosage). Rx pended reflecting new increased dosage.

## 2019-06-19 RX ORDER — PROPRANOLOL HYDROCHLORIDE 120 MG/1
120 CAPSULE, EXTENDED RELEASE ORAL DAILY
Qty: 90 CAPSULE | Refills: 3 | Status: SHIPPED | OUTPATIENT
Start: 2019-06-19

## 2019-07-31 ENCOUNTER — EXTERNAL CHRONIC CARE MANAGEMENT (OUTPATIENT)
Dept: PRIMARY CARE CLINIC | Facility: CLINIC | Age: 74
End: 2019-07-31
Payer: MEDICARE

## 2019-07-31 PROCEDURE — 99490 PR CHRONIC CARE MGMT, 1ST 20 MIN: ICD-10-PCS | Mod: S$PBB,,, | Performed by: FAMILY MEDICINE

## 2019-07-31 PROCEDURE — 99490 CHRNC CARE MGMT STAFF 1ST 20: CPT | Mod: S$PBB,,, | Performed by: FAMILY MEDICINE

## 2019-07-31 PROCEDURE — 99490 CHRNC CARE MGMT STAFF 1ST 20: CPT | Mod: PBBFAC | Performed by: FAMILY MEDICINE

## 2019-08-03 RX ORDER — TRAMADOL HYDROCHLORIDE 50 MG/1
100 TABLET ORAL 4 TIMES DAILY
Qty: 240 TABLET | Refills: 1 | Status: SHIPPED | OUTPATIENT
Start: 2019-08-03 | End: 2019-10-20 | Stop reason: SDUPTHER

## 2019-08-04 RX ORDER — CITALOPRAM 40 MG/1
TABLET, FILM COATED ORAL
Qty: 90 TABLET | Refills: 0 | Status: SHIPPED | OUTPATIENT
Start: 2019-08-04 | End: 2019-11-11 | Stop reason: SDUPTHER

## 2019-08-04 RX ORDER — LEVOTHYROXINE SODIUM 125 UG/1
TABLET ORAL
Qty: 90 TABLET | Refills: 3 | Status: SHIPPED | OUTPATIENT
Start: 2019-08-04 | End: 2019-08-12

## 2019-08-06 ENCOUNTER — TELEPHONE (OUTPATIENT)
Dept: FAMILY MEDICINE | Facility: CLINIC | Age: 74
End: 2019-08-06

## 2019-08-06 NOTE — TELEPHONE ENCOUNTER
----- Message from Phoebe Lay sent at 8/6/2019  2:53 PM CDT -----  Contact: [phone]-Miguel (spouse)  Patient  is requesting a callback.

## 2019-08-07 ENCOUNTER — OFFICE VISIT (OUTPATIENT)
Dept: FAMILY MEDICINE | Facility: CLINIC | Age: 74
End: 2019-08-07
Payer: MEDICARE

## 2019-08-07 DIAGNOSIS — R53.1 WEAKNESS: ICD-10-CM

## 2019-08-07 DIAGNOSIS — R44.3 HALLUCINATIONS: ICD-10-CM

## 2019-08-07 DIAGNOSIS — G60.0 CMTD (CHARCOT-MARIE-TOOTH DISEASE): Primary | ICD-10-CM

## 2019-08-07 DIAGNOSIS — R25.1 TREMOR: ICD-10-CM

## 2019-08-07 DIAGNOSIS — H35.30 MACULAR DEGENERATION, UNSPECIFIED LATERALITY, UNSPECIFIED TYPE: ICD-10-CM

## 2019-08-07 PROCEDURE — 99349 HOME/RES VST EST MOD MDM 40: CPT | Mod: S$GLB,,, | Performed by: FAMILY MEDICINE

## 2019-08-07 PROCEDURE — 99349 PR HOME VISIT,ESTAB PATIENT,LEVEL III: ICD-10-PCS | Mod: S$GLB,,, | Performed by: FAMILY MEDICINE

## 2019-08-07 NOTE — PROGRESS NOTES
VO per Dr Resendez - Orders placed for Home Health and MRI.  Orders faxed to [facility].

## 2019-08-08 ENCOUNTER — TELEPHONE (OUTPATIENT)
Dept: FAMILY MEDICINE | Facility: CLINIC | Age: 74
End: 2019-08-08

## 2019-08-08 PROCEDURE — G0180 PR HOME HEALTH MD CERTIFICATION: ICD-10-PCS | Mod: ,,, | Performed by: FAMILY MEDICINE

## 2019-08-08 PROCEDURE — G0180 MD CERTIFICATION HHA PATIENT: HCPCS | Mod: ,,, | Performed by: FAMILY MEDICINE

## 2019-08-08 NOTE — TELEPHONE ENCOUNTER
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

## 2019-08-09 NOTE — TELEPHONE ENCOUNTER
----- Message from Loren Pablo sent at 8/9/2019  3:29 PM CDT -----  Contact: Ninfa / [facility] [phone]  Ninfa is calling to speak with you concerning shortness of breath and crackles at the base of her lungs.   Please call back to assist at [phone]

## 2019-08-12 ENCOUNTER — TELEPHONE (OUTPATIENT)
Dept: FAMILY MEDICINE | Facility: CLINIC | Age: 74
End: 2019-08-12

## 2019-08-12 DIAGNOSIS — R13.12 OROPHARYNGEAL DYSPHAGIA: Primary | ICD-10-CM

## 2019-08-12 NOTE — TELEPHONE ENCOUNTER
No more diarrhea  Still with bad hand tremors; diff to eat or drink from a cup or with a straw  Blood drawn today  No MRI yet  [facility] thought she needed lasix  has't been called in yet  Pt is eating now  Vlad when flat, has to sleep sitting up  Needs a BNP  Choking on her food;  No speech therapy

## 2019-08-12 NOTE — TELEPHONE ENCOUNTER
Pt states that you mentioned to Ninfa that you would be sending in Lasix?      [facility]      Please let pt's  know as soon as it is done.         ----- Message from Ninoska Cobos sent at 8/12/2019  8:56 AM CDT -----  Contact: [phone] / pt   I spoke with Mr. Leggett this morning regarding the patients MRI. While on the line he was requesting to speak with someone about the patient's medications. States he thinks something was suppose to be sent to the pharmacy but he is not sure. Can someone give him a call?    Thank you

## 2019-08-14 ENCOUNTER — TELEPHONE (OUTPATIENT)
Dept: FAMILY MEDICINE | Facility: CLINIC | Age: 74
End: 2019-08-14

## 2019-08-17 NOTE — PROGRESS NOTES
Subjective:      Patient ID: Yuly Leggett is a 73 y.o. female.    Chief Complaint: No chief complaint on file.      There were no vitals filed for this visit.     HPI   Pt seen on house call at request of  for worsening weakness.  Pt has CMTD for decades, now spends most of time in bed, has a frequent sitter, has vision issues; has had tremors, contracted feet.    Review of Systems   Constitutional: Positive for activity change and fatigue.   HENT: Negative.    Eyes: Positive for visual disturbance.   Respiratory: Positive for shortness of breath.    Cardiovascular: Negative.    Gastrointestinal: Negative.    Endocrine: Negative.    Genitourinary: Negative.    Musculoskeletal: Positive for gait problem.   Neurological: Positive for tremors and weakness.   Psychiatric/Behavioral: Positive for dysphoric mood.   All other systems reviewed and are negative.    Objective:     Physical Exam   Constitutional: She is oriented to person, place, and time. She appears well-developed and well-nourished.   ch ill appearing, overweight   HENT:   Head: Normocephalic.   Eyes: Pupils are equal, round, and reactive to light. Conjunctivae and EOM are normal.   Neck: Normal range of motion. Neck supple.   Cardiovascular: Normal rate, regular rhythm and normal heart sounds.   Pulmonary/Chest: Effort normal and breath sounds normal.   Abdominal: Soft. Bowel sounds are normal. She exhibits no distension. There is no tenderness.   Musculoskeletal: Normal range of motion. She exhibits deformity. She exhibits no edema or tenderness.   Neurological: She is alert and oriented to person, place, and time. She has normal reflexes. No cranial nerve deficit. Coordination normal.   Skin: Skin is warm and dry.   Psychiatric: She has a normal mood and affect. Her behavior is normal. Judgment and thought content normal.   Nursing note and vitals reviewed.    Assessment:     1. CMTD (Charcot-Selena-Tooth disease)    2. Weakness    3. Hallucinations     4. Tremor    5. Macular degeneration, unspecified laterality, unspecified type      Plan:        Medication List           Accurate as of 8/7/19 11:59 PM. If you have any questions, ask your nurse or doctor.               CONTINUE taking these medications    amitriptyline 25 MG tablet  Commonly known as:  ELAVIL  TAKE 1 TABLET (25 MG TOTAL) BY MOUTH NIGHTLY AS NEEDED FOR INSOMNIA.     ascorbic acid (vitamin C) 1000 MG tablet  Commonly known as:  VITAMIN C     buPROPion 300 MG 24 hr tablet  Commonly known as:  WELLBUTRIN XL     citalopram 40 MG tablet  Commonly known as:  CELEXA  TAKE 1 TABLET BY MOUTH AT BEDTIME     CORICIDIN HBP COLD-MULTI SYMPT 6.25- mg/15 mL Liqd  Generic drug:  doxylamine-DM-acetaminophen     fluconazole 200 MG Tab  Commonly known as:  DIFLUCAN  TAKE 1 TABLET (200 MG TOTAL) BY MOUTH ONCE DAILY FOR YEAST     * levothyroxine 125 MCG tablet  Commonly known as:  SYNTHROID     * levothyroxine 125 MCG tablet  Commonly known as:  SYNTHROID  TAKE 1 TABLET BY MOUTH EVERY MORNING ON EMPTY STOMACH     montelukast 10 mg tablet  Commonly known as:  SINGULAIR  TAKE 1 TABLET BY MOUTH EVERY DAY AFTER DINNER     oxybutynin 5 MG Tab  Commonly known as:  DITROPAN  TAKE 1 TABLET BY MOUTH DAILY     PRESERVISION AREDS-2 133-157-88-1 mg-unit-mg-mg Cap  Generic drug:  vit C,Z-Ol-riqbq-lutein-zeaxan     propranolol 120 MG 24 hr capsule  Commonly known as:  INDERAL [place]  Take 1 capsule (120 mg total) by mouth once daily.     senna-docusate 8.6-50 mg 8.6-50 mg per tablet  Commonly known as:  PERICOLACE  Take 1 tablet by mouth 2 (two) times daily.     topiramate 50 MG tablet  Commonly known as:  TOPAMAX  TAKE 3 TABLETS BY MOUTH TWICE A DAY     traMADol 50 mg tablet  Commonly known as:  ULTRAM  TAKE 2 TABLETS (100 MG TOTAL) BY MOUTH 4 (FOUR) TIMES DAILY.     VITAMIN D3 2,000 unit Tab  Generic drug:  cholecalciferol (vitamin D3)         * This list has 2 medication(s) that are the same as other medications prescribed  for you. Read the directions carefully, and ask your doctor or other care provider to review them with you.              CMTD (Charcot-Selena-Tooth disease)  -     MRI Brain Without Contrast; Future; Expected date: 08/07/2019  -     Ambulatory referral to Home Health    Weakness  -     MRI Brain Without Contrast; Future; Expected date: 08/07/2019  -     Ambulatory referral to Home Health    Hallucinations  -     MRI Brain Without Contrast; Future; Expected date: 08/07/2019  -     Ambulatory referral to Home Health    Tremor  -     Ambulatory referral to Home Health    Macular degeneration, unspecified laterality, unspecified type  -     Ambulatory referral to Home Health

## 2019-08-22 ENCOUNTER — EXTERNAL HOME HEALTH (OUTPATIENT)
Dept: HOME HEALTH SERVICES | Facility: HOSPITAL | Age: 74
End: 2019-08-22
Payer: MEDICARE

## 2019-08-31 ENCOUNTER — EXTERNAL CHRONIC CARE MANAGEMENT (OUTPATIENT)
Dept: PRIMARY CARE CLINIC | Facility: CLINIC | Age: 74
End: 2019-08-31
Payer: MEDICARE

## 2019-08-31 PROCEDURE — 99490 PR CHRONIC CARE MGMT, 1ST 20 MIN: ICD-10-PCS | Mod: S$PBB,,, | Performed by: FAMILY MEDICINE

## 2019-08-31 PROCEDURE — 99490 CHRNC CARE MGMT STAFF 1ST 20: CPT | Mod: PBBFAC | Performed by: FAMILY MEDICINE

## 2019-08-31 PROCEDURE — 99490 CHRNC CARE MGMT STAFF 1ST 20: CPT | Mod: S$PBB,,, | Performed by: FAMILY MEDICINE

## 2019-09-30 ENCOUNTER — EXTERNAL CHRONIC CARE MANAGEMENT (OUTPATIENT)
Dept: PRIMARY CARE CLINIC | Facility: CLINIC | Age: 74
End: 2019-09-30
Payer: MEDICARE

## 2019-09-30 ENCOUNTER — TELEPHONE (OUTPATIENT)
Dept: FAMILY MEDICINE | Facility: CLINIC | Age: 74
End: 2019-09-30

## 2019-09-30 PROCEDURE — 99490 PR CHRONIC CARE MGMT, 1ST 20 MIN: ICD-10-PCS | Mod: S$PBB,,, | Performed by: FAMILY MEDICINE

## 2019-09-30 PROCEDURE — 99490 CHRNC CARE MGMT STAFF 1ST 20: CPT | Mod: S$PBB,,, | Performed by: FAMILY MEDICINE

## 2019-09-30 PROCEDURE — 99490 CHRNC CARE MGMT STAFF 1ST 20: CPT | Mod: PBBFAC | Performed by: FAMILY MEDICINE

## 2019-10-20 RX ORDER — TRAMADOL HYDROCHLORIDE 50 MG/1
100 TABLET ORAL 4 TIMES DAILY
Qty: 240 TABLET | Refills: 1 | Status: SHIPPED | OUTPATIENT
Start: 2019-10-20 | End: 2019-10-21

## 2019-10-21 RX ORDER — TRAMADOL HYDROCHLORIDE 50 MG/1
100 TABLET ORAL EVERY 4 HOURS PRN
Qty: 120 TABLET | Refills: 0 | Status: SHIPPED | OUTPATIENT
Start: 2019-10-21 | End: 2019-10-29 | Stop reason: SDUPTHER

## 2019-10-21 NOTE — TELEPHONE ENCOUNTER
Please see pend RX      ----- Message from Lisa Cuevas sent at 10/21/2019  1:09 PM CDT -----  Contact:  bo - [phone]  Patient is requesting a refill on the below. Please advise    It needs to be rewritten to say medical necessary     traMADol (ULTRAM) 50 mg tablet 240 tablet         Pharmacy     [facility]/PHARMACY #[id number] - [place], [place] - [address] AT [facility]

## 2019-10-22 ENCOUNTER — TELEPHONE (OUTPATIENT)
Dept: FAMILY MEDICINE | Facility: CLINIC | Age: 74
End: 2019-10-22

## 2019-10-23 ENCOUNTER — PATIENT MESSAGE (OUTPATIENT)
Dept: FAMILY MEDICINE | Facility: CLINIC | Age: 74
End: 2019-10-23

## 2019-10-28 ENCOUNTER — TELEPHONE (OUTPATIENT)
Dept: FAMILY MEDICINE | Facility: CLINIC | Age: 74
End: 2019-10-28

## 2019-10-28 NOTE — TELEPHONE ENCOUNTER
Pt's  is requesting a 30 day supply for meds.  She is taking Tramadol 50 mg 2 tabs po 4 times a day prn pain.  Qty - 240     Please date it for 11/04/2019    Please indicate that it is medically necessary for greater than 7 days with Dx code on it.

## 2019-10-29 RX ORDER — TRAMADOL HYDROCHLORIDE 50 MG/1
100 TABLET ORAL 4 TIMES DAILY
Qty: 240 TABLET | Refills: 0 | Status: SHIPPED | OUTPATIENT
Start: 2019-10-29 | End: 2019-12-04 | Stop reason: SDUPTHER

## 2019-10-31 ENCOUNTER — EXTERNAL CHRONIC CARE MANAGEMENT (OUTPATIENT)
Dept: PRIMARY CARE CLINIC | Facility: CLINIC | Age: 74
End: 2019-10-31
Payer: MEDICARE

## 2019-10-31 PROCEDURE — 99490 CHRNC CARE MGMT STAFF 1ST 20: CPT | Mod: S$PBB,,, | Performed by: FAMILY MEDICINE

## 2019-10-31 PROCEDURE — 99490 PR CHRONIC CARE MGMT, 1ST 20 MIN: ICD-10-PCS | Mod: S$PBB,,, | Performed by: FAMILY MEDICINE

## 2019-10-31 PROCEDURE — 99490 CHRNC CARE MGMT STAFF 1ST 20: CPT | Mod: PBBFAC | Performed by: FAMILY MEDICINE

## 2019-11-11 RX ORDER — CITALOPRAM 40 MG/1
TABLET, FILM COATED ORAL
Qty: 90 TABLET | Refills: 3 | Status: SHIPPED | OUTPATIENT
Start: 2019-11-11

## 2019-11-30 ENCOUNTER — EXTERNAL CHRONIC CARE MANAGEMENT (OUTPATIENT)
Dept: PRIMARY CARE CLINIC | Facility: CLINIC | Age: 74
End: 2019-11-30
Payer: MEDICARE

## 2019-11-30 PROCEDURE — 99490 CHRNC CARE MGMT STAFF 1ST 20: CPT | Mod: S$PBB,,, | Performed by: FAMILY MEDICINE

## 2019-11-30 PROCEDURE — 99490 CHRNC CARE MGMT STAFF 1ST 20: CPT | Mod: PBBFAC | Performed by: FAMILY MEDICINE

## 2019-11-30 PROCEDURE — 99490 PR CHRONIC CARE MGMT, 1ST 20 MIN: ICD-10-PCS | Mod: S$PBB,,, | Performed by: FAMILY MEDICINE

## 2019-12-05 RX ORDER — TRAMADOL HYDROCHLORIDE 50 MG/1
TABLET ORAL
Qty: 240 TABLET | Refills: 0 | Status: SHIPPED | OUTPATIENT
Start: 2019-12-05 | End: 2020-01-06

## 2019-12-05 NOTE — TELEPHONE ENCOUNTER
----- Message from Phoebe Lay sent at 12/5/2019 12:52 PM CST -----  Contact[facility] [phone]  pharm called requesting directions for med: traMADol (ULTRAM) 50 mg tablet.

## 2019-12-07 RX ORDER — OXYBUTYNIN CHLORIDE 5 MG/1
TABLET ORAL
Qty: 90 TABLET | Refills: 2 | Status: SHIPPED | OUTPATIENT
Start: 2019-12-07 | End: 2019-12-10 | Stop reason: SDUPTHER

## 2019-12-10 ENCOUNTER — TELEPHONE (OUTPATIENT)
Dept: FAMILY MEDICINE | Facility: CLINIC | Age: 74
End: 2019-12-10

## 2019-12-10 RX ORDER — OXYBUTYNIN CHLORIDE 5 MG/1
5 TABLET ORAL 2 TIMES DAILY
Qty: 180 TABLET | Refills: 2 | Status: SHIPPED | OUTPATIENT
Start: 2019-12-10

## 2019-12-10 NOTE — TELEPHONE ENCOUNTER
----- Message from Alejandra Prather sent at 12/10/2019 10:02 AM CST -----  Contact: Phill, spouse, [phone]  Requests to speak with you about patient's urinary control issue, using Oxybutynin 5 mg for 10+ years and wanting to discuss increasing dose.

## 2019-12-10 NOTE — TELEPHONE ENCOUNTER
Pt's  is requesting that you increase the dosage of Oxybutynin to 5 mg BID. He's having to change the bed pad more frequently and is thinking that if Oxybutynin was increased this may help him and the patient out.

## 2019-12-11 NOTE — TELEPHONE ENCOUNTER
Patient's spouse advised new prescription sent to pharmacy.  No further action needed at this time.

## 2019-12-31 ENCOUNTER — EXTERNAL CHRONIC CARE MANAGEMENT (OUTPATIENT)
Dept: PRIMARY CARE CLINIC | Facility: CLINIC | Age: 74
End: 2019-12-31
Payer: MEDICARE

## 2019-12-31 PROCEDURE — 99490 CHRNC CARE MGMT STAFF 1ST 20: CPT | Mod: PBBFAC | Performed by: FAMILY MEDICINE

## 2019-12-31 PROCEDURE — 99490 PR CHRONIC CARE MGMT, 1ST 20 MIN: ICD-10-PCS | Mod: S$PBB,,, | Performed by: FAMILY MEDICINE

## 2019-12-31 PROCEDURE — 99490 CHRNC CARE MGMT STAFF 1ST 20: CPT | Mod: S$PBB,,, | Performed by: FAMILY MEDICINE

## 2020-01-06 RX ORDER — MONTELUKAST SODIUM 10 MG/1
TABLET ORAL
Qty: 90 TABLET | Refills: 2 | Status: SHIPPED | OUTPATIENT
Start: 2020-01-06

## 2020-01-06 RX ORDER — TRAMADOL HYDROCHLORIDE 50 MG/1
TABLET ORAL
Qty: 240 TABLET | Refills: 0 | Status: SHIPPED | OUTPATIENT
Start: 2020-01-06 | End: 2020-02-17 | Stop reason: SDUPTHER

## 2020-01-31 ENCOUNTER — EXTERNAL CHRONIC CARE MANAGEMENT (OUTPATIENT)
Dept: PRIMARY CARE CLINIC | Facility: CLINIC | Age: 75
End: 2020-01-31
Payer: MEDICARE

## 2020-01-31 PROCEDURE — 99490 CHRNC CARE MGMT STAFF 1ST 20: CPT | Mod: S$PBB,,, | Performed by: FAMILY MEDICINE

## 2020-01-31 PROCEDURE — 99490 CHRNC CARE MGMT STAFF 1ST 20: CPT | Mod: PBBFAC | Performed by: FAMILY MEDICINE

## 2020-01-31 PROCEDURE — 99490 PR CHRONIC CARE MGMT, 1ST 20 MIN: ICD-10-PCS | Mod: S$PBB,,, | Performed by: FAMILY MEDICINE

## 2020-02-09 ENCOUNTER — TELEPHONE (OUTPATIENT)
Dept: FAMILY MEDICINE | Facility: CLINIC | Age: 75
End: 2020-02-09

## 2020-02-09 DIAGNOSIS — T07.XXXA WOUNDS, MULTIPLE: Primary | ICD-10-CM

## 2020-02-18 RX ORDER — TRAMADOL HYDROCHLORIDE 50 MG/1
TABLET ORAL
Qty: 240 TABLET | Refills: 0 | Status: SHIPPED | OUTPATIENT
Start: 2020-02-18 | End: 2020-03-27

## 2020-02-29 ENCOUNTER — EXTERNAL CHRONIC CARE MANAGEMENT (OUTPATIENT)
Dept: PRIMARY CARE CLINIC | Facility: CLINIC | Age: 75
End: 2020-02-29
Payer: MEDICARE

## 2020-02-29 PROCEDURE — 99490 PR CHRONIC CARE MGMT, 1ST 20 MIN: ICD-10-PCS | Mod: S$PBB,,, | Performed by: FAMILY MEDICINE

## 2020-02-29 PROCEDURE — 99490 CHRNC CARE MGMT STAFF 1ST 20: CPT | Mod: S$PBB,,, | Performed by: FAMILY MEDICINE

## 2020-03-11 RX ORDER — AMITRIPTYLINE HYDROCHLORIDE 25 MG/1
25 TABLET, FILM COATED ORAL NIGHTLY PRN
Qty: 90 TABLET | Refills: 3 | Status: SHIPPED | OUTPATIENT
Start: 2020-03-11 | End: 2020-03-11 | Stop reason: SDUPTHER

## 2020-03-11 NOTE — TELEPHONE ENCOUNTER
----- Message from Osorio Barnes sent at 3/11/2020  3:46 PM CDT -----  Contact: [phone] / Logan ,    Patient would like a refill for the medication amitriptyline (ELAVIL) 25 MG tablet, [facility] is [facility] [place], [place]. Please Advise.

## 2020-03-12 RX ORDER — AMITRIPTYLINE HYDROCHLORIDE 25 MG/1
25 TABLET, FILM COATED ORAL NIGHTLY PRN
Qty: 90 TABLET | Refills: 3 | Status: SHIPPED | OUTPATIENT
Start: 2020-03-12

## 2020-03-26 ENCOUNTER — TELEPHONE (OUTPATIENT)
Dept: FAMILY MEDICINE | Facility: CLINIC | Age: 75
End: 2020-03-26

## 2020-03-26 RX ORDER — CODEINE PHOSPHATE AND GUAIFENESIN 10; 100 MG/5ML; MG/5ML
10 SOLUTION ORAL EVERY 4 HOURS PRN
Qty: 240 ML | Refills: 0 | Status: SHIPPED | OUTPATIENT
Start: 2020-03-26 | End: 2020-04-05

## 2020-03-26 NOTE — TELEPHONE ENCOUNTER
----- Message from Sowmya Zavala sent at 3/26/2020  8:04 AM CDT -----  Contact: [phone]/  Type:  RX Refill Request    Who Called:  self  Refill or New Rx: NEW  RX Name and Strength: GUAIFENESIN/CODEINS SYRUP  How is the patient currently taking it? (ex. 1XDay): take 10ml every 4 hours as needed for cough  Is this a 30 day or 90 day RX:   Preferred Pharmacy with phone number: [facility]/PHARMACY #[id number] - [place], [address] AT [facility] [address]  Local or Mail Order:   Ordering Provider: Dr. Calvillo gave it to her regularly due to her disease  Would the patient rather a call back or a response via [facility]?  call  Best Call Back Number:   Additional Information:  Her cough is worse than normal. It can't be controlled. Using OTC meds but not working

## 2020-03-26 NOTE — TELEPHONE ENCOUNTER
Pt's  is requesting that you send in a prescription for Guaifenesin and Codeine cough syrup. OTC cough medications aren't working for her.

## 2020-03-27 RX ORDER — TRAMADOL HYDROCHLORIDE 50 MG/1
TABLET ORAL
Qty: 240 TABLET | Refills: 0 | Status: SHIPPED | OUTPATIENT
Start: 2020-03-27

## 2020-03-30 ENCOUNTER — OFFICE VISIT (OUTPATIENT)
Dept: FAMILY MEDICINE | Facility: CLINIC | Age: 75
End: 2020-03-30
Payer: MEDICARE

## 2020-03-30 DIAGNOSIS — R50.9 FEVER, UNSPECIFIED FEVER CAUSE: Primary | ICD-10-CM

## 2020-03-30 PROCEDURE — 99213 OFFICE O/P EST LOW 20 MIN: CPT | Mod: 95,,, | Performed by: FAMILY MEDICINE

## 2020-03-30 PROCEDURE — 99213 PR OFFICE/OUTPT VISIT, EST, LEVL III, 20-29 MIN: ICD-10-PCS | Mod: 95,,, | Performed by: FAMILY MEDICINE

## 2020-03-30 NOTE — PROGRESS NOTES
"Subjective:      Patient ID: Yuly Leggett is a 74 y.o. female.    Chief Complaint: No chief complaint on file.      There were no vitals filed for this visit.     HPI   The patient location is: home  The chief complaint leading to consultation is: fever  Visit type: Virtual visit with synchronous audio and video  Total time spent with patient: 12 minutes  Each patient to whom he or she provides medical services by telemedicine is:  (1) informed of the relationship between the physician and patient and the respective role of any other health care provider with respect to management of the patient; and (2) notified that he or she may decline to receive medical services by telemedicine and may withdraw from such care at any time.    Notes:  called me last night because of relative high fever 100.9    Fever this weekend, 100.4, coughing; pt has Charcot Selena Tooth syndrome for many years, getting worse.   tremors, is bed/wheelchair bound, cared for by her  and a sitter; sitter had been ill with covid 19 symptoms and never tested and has been isolating; she feels fine;   This pt is pretty weak, right now, accordiong to    called for other helps, the sitter is avalable Wednesday  Pt feels "fairly good", but tired; denies SOB;  Feels miserable, doesn't look SOB on video      Review of Systems   Constitutional: Positive for fever.   HENT: Negative.    Respiratory: Positive for cough.    Cardiovascular: Negative.    Gastrointestinal: Negative.    Endocrine: Negative.    Genitourinary: Negative.    Musculoskeletal: Negative.    Neurological: Positive for weakness.   Psychiatric/Behavioral: Negative.    All other systems reviewed and are negative.    Objective:     Physical Exam  Assessment:     1. Fever, unspecified fever cause      Plan:        Medication List           Accurate as of March 30, 2020 11:48 AM. If you have any questions, ask your nurse or doctor.               CONTINUE taking these " medications    amitriptyline 25 MG tablet  Commonly known as:  ELAVIL  Take 1 tablet (25 mg total) by mouth nightly as needed for Insomnia.     ascorbic acid (vitamin C) 1000 MG tablet  Commonly known as:  VITAMIN C     buPROPion 300 MG 24 hr tablet  Commonly known as:  WELLBUTRIN XL     citalopram 40 MG tablet  Commonly known as:  CELEXA  TAKE 1 TABLET BY MOUTH AT BEDTIME     CORICIDIN HBP COLD-MULTI SYMPT 6.25- mg/15 mL Liqd  Generic drug:  doxylamine-DM-acetaminophen     fluconazole 200 MG Tab  Commonly known as:  DIFLUCAN  TAKE 1 TABLET (200 MG TOTAL) BY MOUTH ONCE DAILY FOR YEAST     guaifenesin-codeine 100-10 mg/5 ml  mg/5 mL syrup  Commonly known as:  TUSSI-ORGANIDIN NR  Take 10 mLs by mouth every 4 (four) hours as needed for Cough.     levothyroxine 125 MCG tablet  Commonly known as:  SYNTHROID     montelukast 10 mg tablet  Commonly known as:  SINGULAIR  TAKE 1 TABLET BY MOUTH EVERY DAY AFTER DINNER     oxybutynin 5 MG Tab  Commonly known as:  DITROPAN  Take 1 tablet (5 mg total) by mouth 2 (two) times daily.     PRESERVISION AREDS-2 419-539-84-1 mg-unit-mg-mg Cap  Generic drug:  vit C,O-He-ysqjw-lutein-zeaxan     propranoloL 120 MG 24 hr capsule  Commonly known as:  INDERAL [place]  Take 1 capsule (120 mg total) by mouth once daily.     senna-docusate 8.6-50 mg 8.6-50 mg per tablet  Commonly known as:  PERICOLACE  Take 1 tablet by mouth 2 (two) times daily.     topiramate 50 MG tablet  Commonly known as:  TOPAMAX  TAKE 3 TABLETS BY MOUTH TWICE A DAY     traMADoL 50 mg tablet  Commonly known as:  ULTRAM  TAKE 2 TABLETS BY MOUTH 4 TIMES A DAY AS NEEDED     VITAMIN D3 50 mcg (2,000 unit) Tab  Generic drug:  cholecalciferol (vitamin D3)          Fever, unspecified fever cause  -     SARS- CoV-2 (COVID-19) QUALITATIVE PCR; Future; Expected date: 03/30/2020

## 2020-03-31 ENCOUNTER — EXTERNAL CHRONIC CARE MANAGEMENT (OUTPATIENT)
Dept: PRIMARY CARE CLINIC | Facility: CLINIC | Age: 75
End: 2020-03-31
Payer: MEDICARE

## 2020-03-31 PROCEDURE — 99490 PR CHRONIC CARE MGMT, 1ST 20 MIN: ICD-10-PCS | Mod: S$PBB,,, | Performed by: FAMILY MEDICINE

## 2020-03-31 PROCEDURE — 99490 CHRNC CARE MGMT STAFF 1ST 20: CPT | Mod: S$PBB,,, | Performed by: FAMILY MEDICINE

## 2020-04-02 ENCOUNTER — CLINICAL SUPPORT (OUTPATIENT)
Dept: INTERNAL MEDICINE | Facility: CLINIC | Age: 75
End: 2020-04-02
Payer: MEDICARE

## 2020-04-02 DIAGNOSIS — R50.9 FEVER, UNSPECIFIED FEVER CAUSE: ICD-10-CM

## 2020-04-02 PROCEDURE — U0002 COVID-19 LAB TEST NON-CDC: HCPCS

## 2020-04-03 ENCOUNTER — TELEPHONE (OUTPATIENT)
Dept: FAMILY MEDICINE | Facility: CLINIC | Age: 75
End: 2020-04-03

## 2020-04-03 LAB — SARS-COV-2 RNA RESP QL NAA+PROBE: DETECTED

## 2020-04-03 RX ORDER — AZITHROMYCIN 250 MG/1
TABLET, FILM COATED ORAL
Qty: 6 TABLET | Refills: 0 | Status: SHIPPED | OUTPATIENT
Start: 2020-04-03

## 2020-04-04 ENCOUNTER — TELEPHONE (OUTPATIENT)
Dept: FAMILY MEDICINE | Facility: CLINIC | Age: 75
End: 2020-04-04

## 2020-04-04 DIAGNOSIS — G60.0 CMTD (CHARCOT-MARIE-TOOTH DISEASE): Primary | ICD-10-CM

## 2020-04-04 DIAGNOSIS — U07.1 COVID-19 VIRUS INFECTION: ICD-10-CM

## 2020-04-04 NOTE — TELEPHONE ENCOUNTER
called; pt and he are both Positive for Covid 19; she is weak, febrile and not eating drinking.    He doesn't want her to die at hospital alone, he is asking for hospice care so she can stay home.

## 2024-03-15 NOTE — TELEPHONE ENCOUNTER
----- Message from Varsha Chawla sent at 10/22/2019  1:13 PM CDT -----  Contact: Dakota Leggett ()/[phone]  Patient's  called to speak with your office in regard to getting pain medication for his wife.    Please call [phone] to discuss as soon as possible per Dakota.    traMADol (ULTRAM) 50 mg tablet    [facility]/PHARMACY #[id number] - [address] AT [facility]  
[facility]  
show